# Patient Record
Sex: FEMALE | Race: WHITE | Employment: OTHER | RURAL
[De-identification: names, ages, dates, MRNs, and addresses within clinical notes are randomized per-mention and may not be internally consistent; named-entity substitution may affect disease eponyms.]

---

## 2019-11-12 PROBLEM — H25.12 AGE-RELATED NUCLEAR CATARACT, LEFT EYE: Chronic | Status: ACTIVE | Noted: 2019-11-12

## 2019-11-13 PROBLEM — H25.12 AGE-RELATED NUCLEAR CATARACT, LEFT EYE: Chronic | Status: RESOLVED | Noted: 2019-11-12 | Resolved: 2019-11-13

## 2019-11-19 PROBLEM — H25.11 AGE-RELATED NUCLEAR CATARACT, RIGHT EYE: Chronic | Status: ACTIVE | Noted: 2019-11-19

## 2019-11-20 PROBLEM — H25.11 AGE-RELATED NUCLEAR CATARACT, RIGHT EYE: Chronic | Status: RESOLVED | Noted: 2019-11-19 | Resolved: 2019-11-20

## 2019-11-26 PROBLEM — T85.29XD: Chronic | Status: ACTIVE | Noted: 2019-11-26

## 2019-11-27 PROBLEM — T85.29XD: Chronic | Status: RESOLVED | Noted: 2019-11-26 | Resolved: 2019-11-27

## 2021-04-20 ENCOUNTER — HOSPITAL ENCOUNTER (OUTPATIENT)
Dept: MAMMOGRAPHY | Age: 71
Discharge: HOME | End: 2021-04-20
Attending: FAMILY MEDICINE
Payer: MEDICARE

## 2021-04-20 DIAGNOSIS — Z12.31 VISIT FOR SCREENING MAMMOGRAM: ICD-10-CM

## 2021-04-20 PROCEDURE — 77063 BREAST TOMOSYNTHESIS BI: CPT

## 2022-04-22 ENCOUNTER — HOSPITAL ENCOUNTER (OUTPATIENT)
Dept: MAMMOGRAPHY | Age: 72
Discharge: HOME | End: 2022-04-22
Attending: FAMILY MEDICINE
Payer: MEDICARE

## 2022-04-22 VITALS — BODY MASS INDEX: 23.05 KG/M2 | WEIGHT: 122 LBS

## 2022-04-22 DIAGNOSIS — Z12.31 VISIT FOR SCREENING MAMMOGRAM: ICD-10-CM

## 2022-04-22 PROCEDURE — 77063 BREAST TOMOSYNTHESIS BI: CPT

## 2023-02-20 ENCOUNTER — ANESTHESIA EVENT (OUTPATIENT)
Dept: SURGERY | Age: 73
End: 2023-02-20
Payer: MEDICARE

## 2023-02-22 NOTE — PERIOP NOTES
[address] [place] [address]  SURGICAL PRE-ADMISSION INSTRUCTIONS    ARRIVAL  You will be called the day before your surgery with your expected arrival time. Sign in at the  of the hospital.  You will be directed to the Surgical Waiting Room. Please arrive at your scheduled appointment time. You have been scheduled to arrive for your procedure one or two hours prior to the expected start time of your procedure. Every effort will be made to minimize your wait but please be aware that unforeseen circumstances may affect our schedule. EATING  DO NOT EAT OR DRINK ANYTHING AFTER MIDNIGHT ON THE EVENING BEFORE YOUR SURGERY OR ON THE DAY OF YOUR SURGERY except for your medications (as instructed) with a sip of water. Do not use gum, mints or lozenges on the morning of your surgery. Please do not smoke or chew tobacco before your surgery. MEDICATIONS   Take the following medications on the morning of your surgery with the smallest amount of water possible : Omeprazole    STOP THESE MEDICATIONS AT THE TIMES LISTED BELOW  Aspirin ;  7 days before                                                   DRIVING/TRANSPORATION  Have a responsible adult to drive you home from the hospital and to stay with you over night. Please have them plan to remain in the hospital during your surgery. Your surgery will not be done if you do not have a responsible adult to take you home and to stay with you. If you have arranged for public transport, you must have a responsible adult to ride with you (who is not the ). You may not drive for 24 hours after anesthesia. PREPARATION  If you have a Living WiIl/Advance Directive, please bring a copy with you to scan into your chart. Please DO NOT wear makeup or nail polish  Please leave valuables at home,  DO NOT wear jewelry. Wear loose, comfortable clothing that is large enough to cover a bulky dressing.     SPECIAL INSTRUCTIONS:  Shower with the Preoperative Skin Preparation CHLORHEXIDINE as instructed.     Reviewed above preoperative instructions and answered questions by phone interview    Patient:  Lara Sharp   Date:     February 22, 2023  Time:   11:07 AM    RN:  Brendon Graham RN    Date:     February 22, 2023  Time:   11:07 AM

## 2023-02-26 PROBLEM — M65.312 TRIGGER THUMB OF LEFT HAND: Status: ACTIVE | Noted: 2023-02-26

## 2023-02-26 NOTE — H&P
History and Physical    Patient: Sarina Cat MRN: [id number]  SSN: [id number]    YOB: 1950  Age: 71 y.o. Sex: female      Subjective:      Sarina Cat is a 71 y.o. female who has sx left trigger thumb. Here for A1 pulley release left thumb. Past Medical History:   Diagnosis Date    Diabetes ([address]     Menopause      Past Surgical History:   Procedure Laterality Date    HX ORTHOPAEDIC      R Trigger release    [place] BREAST REDUCTION        Family History   Problem Relation Age of Onset    Breast Cancer Mother         dx age 76     Social History     Tobacco Use    Smoking status: Not on file    Smokeless tobacco: Not on file   Substance Use Topics    Alcohol use: Not on file      Prior to Admission medications    Medication Sig Start Date End Date Taking? Authorizing Provider   aspirin delayed-release 81 mg tablet Aspir-81   Yes Provider, Historical   liraglutide (VICTOZA) 0.6 mg/0.1 mL (18 mg/3 mL) pnij Victoza 2-Darin 0.6 mg/0.1 mL (18 mg/3 mL) subcutaneous pen injector   Inject by subcutaneous route. Yes Provider, Historical   cholecalciferol (VITAMIN D3) (1000 Units /25 mcg) tablet Take  by mouth. Yes Provider, Historical   lisinopril (PRINIVIL, ZESTRIL) 2.5 mg tablet lisinopril 2.5 mg tablet   TAKE 1 TABLET BY MOUTH EVERY DAY FOR KIDNEY PROTECTION FOR DIABETES 8/5/19  Yes Provider, Historical   metFORMIN ER (GLUCOPHAGE XR) 500 mg tablet 2 pills before breakfast and 2 pills before dinner 8/5/19  Yes Provider, Historical   omeprazole (PRILOSEC) 20 mg capsule Take 20 mg by mouth. Yes Provider, Historical   vit C,X-Wy-hpdqa-lutein-zeaxan (PRESERVISION AREDS-2) 250-90-40-1 mg cap capsule Take  by mouth. Yes Provider, Historical        Allergies   Allergen Reactions    Exenatide Nausea Only and Palpitations       Review of Systems:  A comprehensive review of systems was negative. Objective: There were no vitals filed for this visit.      Physical Exam:  GENERAL: alert, cooperative, no distress, appears stated age  LUNG: clear to auscultation bilaterally  HEART: regular rate and rhythm, S1, S2 normal, no murmur, click, rub or gallop  ABDOMEN: soft, non-tender. Bowel sounds normal. No masses,  no organomegaly  EXTREMITIES:  extremities normal, atraumatic, no cyanosis or edema. Painful catching at A1 left thumb  SKIN: Normal.  NEUROLOGIC: negative    Assessment:     Hospital Problems  Date Reviewed: 11/27/2019            Codes Class Noted POA    * (Principal) Trigger thumb of left hand ICD-10-CM: M65.312  ICD-9-CM: 727.03  2/26/2023 Yes           Plan:     Left trigger thumb release.     Signed By: Mayur Ridley MD     February 26, 2023

## 2023-02-28 ENCOUNTER — HOSPITAL ENCOUNTER (OUTPATIENT)
Age: 73
Setting detail: OUTPATIENT SURGERY
Discharge: HOME | End: 2023-02-28
Attending: ORTHOPAEDIC SURGERY | Admitting: ORTHOPAEDIC SURGERY
Payer: MEDICARE

## 2023-02-28 ENCOUNTER — ANESTHESIA (OUTPATIENT)
Dept: SURGERY | Age: 73
End: 2023-02-28
Payer: MEDICARE

## 2023-02-28 VITALS
HEART RATE: 72 BPM | OXYGEN SATURATION: 96 % | RESPIRATION RATE: 19 BRPM | TEMPERATURE: 97.6 F | SYSTOLIC BLOOD PRESSURE: 112 MMHG | DIASTOLIC BLOOD PRESSURE: 65 MMHG

## 2023-02-28 DIAGNOSIS — M65.312 TRIGGER THUMB OF LEFT HAND: Primary | ICD-10-CM

## 2023-02-28 LAB
GLUCOSE BLD STRIP.AUTO-MCNC: 114 MG/DL (ref 65–117)
SERVICE CMNT-IMP: NORMAL

## 2023-02-28 PROCEDURE — 74011250636 HC RX REV CODE- 250/636: Performed by: ANESTHESIOLOGY

## 2023-02-28 PROCEDURE — 74011000250 HC RX REV CODE- 250: Performed by: ORTHOPAEDIC SURGERY

## 2023-02-28 PROCEDURE — 76010000138 HC OR TIME 0.5 TO 1 HR: Performed by: ORTHOPAEDIC SURGERY

## 2023-02-28 PROCEDURE — 74011250636 HC RX REV CODE- 250/636: Performed by: ORTHOPAEDIC SURGERY

## 2023-02-28 PROCEDURE — 74011000250 HC RX REV CODE- 250: Performed by: ANESTHESIOLOGY

## 2023-02-28 PROCEDURE — 76210000063 HC OR PH I REC FIRST 0.5 HR: Performed by: ORTHOPAEDIC SURGERY

## 2023-02-28 PROCEDURE — 77030002916 HC SUT ETHLN J&J -A: Performed by: ORTHOPAEDIC SURGERY

## 2023-02-28 PROCEDURE — 76060000032 HC ANESTHESIA 0.5 TO 1 HR: Performed by: ORTHOPAEDIC SURGERY

## 2023-02-28 PROCEDURE — 82962 GLUCOSE BLOOD TEST: CPT

## 2023-02-28 PROCEDURE — 2709999900 HC NON-CHARGEABLE SUPPLY: Performed by: ORTHOPAEDIC SURGERY

## 2023-02-28 PROCEDURE — 77030000032 HC CUF TRNQT ZIMM -B: Performed by: ORTHOPAEDIC SURGERY

## 2023-02-28 RX ORDER — PROPOFOL 10 MG/ML
INJECTION, EMULSION INTRAVENOUS AS NEEDED
Status: DISCONTINUED | OUTPATIENT
Start: 2023-02-28 | End: 2023-02-28 | Stop reason: HOSPADM

## 2023-02-28 RX ORDER — LIDOCAINE HYDROCHLORIDE 20 MG/ML
INJECTION, SOLUTION EPIDURAL; INFILTRATION; INTRACAUDAL; PERINEURAL AS NEEDED
Status: DISCONTINUED | OUTPATIENT
Start: 2023-02-28 | End: 2023-02-28 | Stop reason: HOSPADM

## 2023-02-28 RX ORDER — SODIUM CHLORIDE 0.9 % (FLUSH) 0.9 %
5-40 SYRINGE (ML) INJECTION EVERY 8 HOURS
Status: DISCONTINUED | OUTPATIENT
Start: 2023-02-28 | End: 2023-02-28 | Stop reason: HOSPADM

## 2023-02-28 RX ORDER — HYDROCODONE BITARTRATE AND ACETAMINOPHEN 7.5; 325 MG/1; MG/1
1 TABLET ORAL
Qty: 6 TABLET | Refills: 0 | Status: SHIPPED | OUTPATIENT
Start: 2023-02-28 | End: 2023-03-02

## 2023-02-28 RX ORDER — MIDAZOLAM HYDROCHLORIDE 1 MG/ML
INJECTION, SOLUTION INTRAMUSCULAR; INTRAVENOUS AS NEEDED
Status: DISCONTINUED | OUTPATIENT
Start: 2023-02-28 | End: 2023-02-28 | Stop reason: HOSPADM

## 2023-02-28 RX ORDER — BUPIVACAINE HYDROCHLORIDE 5 MG/ML
10 INJECTION, SOLUTION EPIDURAL; INTRACAUDAL ONCE
Status: COMPLETED | OUTPATIENT
Start: 2023-02-28 | End: 2023-02-28

## 2023-02-28 RX ORDER — SODIUM CHLORIDE, SODIUM LACTATE, POTASSIUM CHLORIDE, CALCIUM CHLORIDE 600; 310; 30; 20 MG/100ML; MG/100ML; MG/100ML; MG/100ML
100 INJECTION, SOLUTION INTRAVENOUS CONTINUOUS
Status: DISCONTINUED | OUTPATIENT
Start: 2023-02-28 | End: 2023-02-28 | Stop reason: HOSPADM

## 2023-02-28 RX ORDER — SODIUM CHLORIDE 0.9 % (FLUSH) 0.9 %
5-40 SYRINGE (ML) INJECTION AS NEEDED
Status: DISCONTINUED | OUTPATIENT
Start: 2023-02-28 | End: 2023-02-28 | Stop reason: HOSPADM

## 2023-02-28 RX ORDER — HYDROCODONE BITARTRATE AND ACETAMINOPHEN 5; 325 MG/1; MG/1
1 TABLET ORAL
Qty: 8 TABLET | Refills: 0 | Status: SHIPPED | OUTPATIENT
Start: 2023-02-28 | End: 2023-03-03

## 2023-02-28 RX ADMIN — SODIUM CHLORIDE, POTASSIUM CHLORIDE, SODIUM LACTATE AND CALCIUM CHLORIDE 100 ML/HR: 600; 310; 30; 20 INJECTION, SOLUTION INTRAVENOUS at 07:40

## 2023-02-28 RX ADMIN — PROPOFOL 20 MG: 10 INJECTION, EMULSION INTRAVENOUS at 07:55

## 2023-02-28 RX ADMIN — PROPOFOL 20 MG: 10 INJECTION, EMULSION INTRAVENOUS at 07:50

## 2023-02-28 RX ADMIN — MIDAZOLAM 2 MG: 1 INJECTION INTRAMUSCULAR; INTRAVENOUS at 07:42

## 2023-02-28 RX ADMIN — PROPOFOL 20 MG: 10 INJECTION, EMULSION INTRAVENOUS at 07:52

## 2023-02-28 RX ADMIN — PROPOFOL 20 MG: 10 INJECTION, EMULSION INTRAVENOUS at 07:48

## 2023-02-28 RX ADMIN — BUPIVACAINE HYDROCHLORIDE 50 MG: 5 INJECTION, SOLUTION EPIDURAL; INTRACAUDAL; PERINEURAL at 08:00

## 2023-02-28 RX ADMIN — PROPOFOL 20 MG: 10 INJECTION, EMULSION INTRAVENOUS at 08:03

## 2023-02-28 RX ADMIN — PROPOFOL 20 MG: 10 INJECTION, EMULSION INTRAVENOUS at 07:59

## 2023-02-28 RX ADMIN — LIDOCAINE HYDROCHLORIDE 60 MG: 20 INJECTION, SOLUTION EPIDURAL; INFILTRATION; INTRACAUDAL; PERINEURAL at 07:48

## 2023-02-28 NOTE — DISCHARGE INSTRUCTIONS
Isabela Hansen MD      POST-OPERATIVE INSTRUCTIONS  Carpal Tunnel Release    Patient Name  Jona Guess  Date of procedure  2/28/2023    Procedure  Procedure(s):  TRIGGER THUMB RELEASE (W/LOCAL)  Surgeon  Surgeon(s) and Role:     * Aaliyah Weinberg MD - Primary  Date of discharge: [unfilled]  PCP: @PCP@      First meal at home should be clear liquids. Progress to regular diet as tolerated. Apply an ice bag or use cold therapy device for 20-30 minutes 4 times a day for the first 48-72 hours to reduce swelling and pain and then use as desired. A prescription for pain medication will be provided to you on the day of surgery. Do not take any aspirin for one week after surgery. Please inform us of any allergies. You may remove the bulky dressing 48 hours after surgery and leave the clear dressing in place. You may shower with clear dressing in place. You may remove the clear dressing 5 days after your surgery, if necessary, and cover with a bandaid. Avoid any lifting, gripping, or use with that hand until follow-up appointment. A post-op appointment has been scheduled for you. Please call the office if you need to re-schedule your appointment for another day or time ([phone]). If you develop a fever greater than 101, unexpected redness or swelling, please call the office immediately. Some bleeding and or drainage can be expected the first few days after surgery. Thank you for following the above instructions. If you have any questions, please call my office and the  will put you in touch with one of my assistants ([phone]).     ______________________________________________________________________    Anesthesia Discharge Instructions    After general anesthesia or intervenous sedation, for 24 hours or while taking prescription Narcotics:  Limit your activities  Do not drive or operate hazardous machinery  If you have not urinated within 8 hours after discharge, please contact your surgeon on call. Do not make important personal or business decisions  Do not drink alcoholic beverages    Report the following to your surgeon:  Excessive pain, swelling, redness or odor of or around the surgical area  Temperature over 100.5 degrees  Nausea and vomiting lasting longer than 4 hours or if unable to take medication  Any signs of decreased circulation or nerve impairment to extremity:  Change in color, persistent numbness, tingling, coldness or increased pain.   Any questions

## 2023-02-28 NOTE — ANESTHESIA POSTPROCEDURE EVALUATION
Procedure(s):  TRIGGER THUMB RELEASE (W/LOCAL).     MAC    Anesthesia Post Evaluation      Multimodal analgesia: multimodal analgesia used between 6 hours prior to anesthesia start to PACU discharge  Patient participation: complete - patient participated  Level of consciousness: awake and alert  Anesthetic complications: no  Cardiovascular status: acceptable  Respiratory status: acceptable  Hydration status: acceptable  Post anesthesia nausea and vomiting:  none  Final Post Anesthesia Temperature Assessment:  Normothermia (36.0-37.5 degrees C)      INITIAL Post-op Vital signs:   Vitals Value Taken Time   /65 02/28/23 0820   Temp 36.4 °C (97.6 °F) 02/28/23 0820   Pulse 67 02/28/23 0820   Resp 17 02/28/23 0820   SpO2 96 % 02/28/23 0820

## 2023-02-28 NOTE — OP NOTES
[address] [place] [address]   [address] [facility], [address], [address] [facility]   OPERATIVE REPORT            Name: Jeny Lundy  MRN:  [id number]  : 1950  Age:  71 y.o. Surgery Date: 2023      PREOPERATIVE DIAGNOSIS: Symptomatic left thumb trigger finger. POSTOPERATIVE DIAGNOSIS: Symptomatic left thumb trigger finger. PROCEDURES PERFORMED: left thumb A1 pulley release. SURGEON: Nohemi Alonso MD.      ANESTHESIA: Local MAC. ESTIMATED BLOOD LOSS: 0 tourniquet control. COMPLICATIONS: None. 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 SPECIMENS REMOVED: none     DESCRIPTION OF PROCEDURE: The patient was evaluated in the   outpatient the patient surgery area. Surgical site was marked and verified with   the patient. The patient was taken to the operating room, placed supine on the   operating table. The arm placed on the arm board. After adequate sedation, the   area was anesthetized with 5 mL of 0.5% Marcaine. The extremity prepped   and draped in usual sterile fashion. Time-out was called, verifying the patient   site and surgical procedure. The extremity was exsanguinated and tourniquet   inflated to 250 mmHg. A transverse incision was made over the involved   finger. Dissection carried sharply through skin, bluntly through subcutaneous   tissue. The retractors were inserted. The neurovascular bundles were retracted   to either side. This gave exposure to the tendon sheath in the wound. After   appropriate retractors were inserted, the proximal portion of the tendon sheath   was incised and split. Additional split crried distally with scissors under direct visualization, completing the   split of the A1 pulley. At this point, the operative site was irrigated with   antibiotic saline solution. The finger was taken through a range of motion to   verify that there was no further triggering through a full range of motion. The   incision closed with 4-0 nylon interrupted sutures. A sterile dressing was   applied. Tourniquet was released for a tourniquet time of 10 minutes. The   patient returned to the recovery room in satisfactory condition.            Manjula Hogue MD

## 2023-02-28 NOTE — BRIEF OP NOTE
Brief Postoperative Note    Patient: Kirby Macdonald  YOB: 1950  MRN: [id number]    Date of Procedure: 2/28/2023     Pre-Op Diagnosis: Left Trigger Thumb    Post-Op Diagnosis: Same as preoperative diagnosis. Procedure(s):  TRIGGER THUMB RELEASE (W/LOCAL)    Surgeon(s):  Cari Chandra MD    Surgical Assistant: None    Anesthesia: MAC     Estimated Blood Loss (mL): Minimal    Complications: None    Specimens: * No specimens in log *     Implants: * No implants in log *    Drains: * No LDAs found *    Findings: stenosis of A1 pulley left thumb.     Electronically Signed by Leonardo Conn MD on 2/28/2023 at 8:18 AM

## 2023-02-28 NOTE — ANESTHESIA PREPROCEDURE EVALUATION
Relevant Problems   No relevant active problems       Anesthetic History   No history of anesthetic complications            Review of Systems / Medical History  Patient summary reviewed, nursing notes reviewed and pertinent labs reviewed    Pulmonary  Within defined limits                 Neuro/Psych   Within defined limits           Cardiovascular  Within defined limits                Exercise tolerance: >4 METS     GI/Hepatic/Renal  Within defined limits              Endo/Other    Diabetes         Other Findings              Physical Exam    Airway  Mallampati: III  TM Distance: 4 - 6 cm  Neck ROM: decreased range of motion   Mouth opening: Diminished (comment)     Cardiovascular  Regular rate and rhythm,  S1 and S2 normal,  no murmur, click, rub, or gallop  Rhythm: regular  Rate: normal         Dental  No notable dental hx       Pulmonary  Breath sounds clear to auscultation               Abdominal  GI exam deferred       Other Findings            Anesthetic Plan    ASA: 2  Anesthesia type: MAC            Anesthetic plan and risks discussed with: Patient

## 2023-04-24 DIAGNOSIS — Z12.31 VISIT FOR SCREENING MAMMOGRAM: Primary | ICD-10-CM

## 2023-05-24 ENCOUNTER — HOSPITAL ENCOUNTER (OUTPATIENT)
Facility: HOSPITAL | Age: 73
Discharge: HOME | End: 2023-05-27
Attending: FAMILY MEDICINE
Payer: MEDICARE

## 2023-05-24 VITALS — BODY MASS INDEX: 23.03 KG/M2 | WEIGHT: 122 LBS | HEIGHT: 61 IN

## 2023-05-24 DIAGNOSIS — Z12.31 VISIT FOR SCREENING MAMMOGRAM: ICD-10-CM

## 2023-05-24 PROCEDURE — 77063 BREAST TOMOSYNTHESIS BI: CPT

## 2023-07-11 ENCOUNTER — HOSPITAL ENCOUNTER (OUTPATIENT)
Facility: HOSPITAL | Age: 73
Discharge: HOME | End: 2023-07-14
Payer: MEDICARE

## 2023-07-11 DIAGNOSIS — M54.50 LOW BACK PAIN, UNSPECIFIED BACK PAIN LATERALITY, UNSPECIFIED CHRONICITY, UNSPECIFIED WHETHER SCIATICA PRESENT: ICD-10-CM

## 2023-07-11 PROCEDURE — 72110 X-RAY EXAM L-2 SPINE 4/>VWS: CPT

## 2023-08-10 ENCOUNTER — HOSPITAL ENCOUNTER (OUTPATIENT)
Facility: HOSPITAL | Age: 73
Discharge: HOME | End: 2023-08-10
Attending: ORTHOPAEDIC SURGERY
Payer: MEDICARE

## 2023-08-10 DIAGNOSIS — G57.02 LESION OF LEFT SCIATIC NERVE: ICD-10-CM

## 2023-08-10 PROCEDURE — 72148 MRI LUMBAR SPINE W/O DYE: CPT

## 2024-05-07 ENCOUNTER — TRANSCRIBE ORDERS (OUTPATIENT)
Facility: HOSPITAL | Age: 74
End: 2024-05-07

## 2024-05-07 DIAGNOSIS — Z12.31 SCREENING MAMMOGRAM FOR BREAST CANCER: Primary | ICD-10-CM

## 2024-05-28 ENCOUNTER — HOSPITAL ENCOUNTER (OUTPATIENT)
Facility: HOSPITAL | Age: 74
Discharge: HOME | End: 2024-05-31
Attending: FAMILY MEDICINE
Payer: MEDICARE

## 2024-05-28 VITALS — WEIGHT: 123 LBS | BODY MASS INDEX: 23.24 KG/M2

## 2024-05-28 DIAGNOSIS — Z12.31 SCREENING MAMMOGRAM FOR BREAST CANCER: ICD-10-CM

## 2024-05-28 PROCEDURE — 77063 BREAST TOMOSYNTHESIS BI: CPT

## 2025-05-12 ENCOUNTER — TRANSCRIBE ORDERS (OUTPATIENT)
Facility: HOSPITAL | Age: 75
End: 2025-05-12

## 2025-05-12 DIAGNOSIS — Z12.31 SCREENING MAMMOGRAM FOR BREAST CANCER: Primary | ICD-10-CM

## 2025-06-12 ENCOUNTER — HOSPITAL ENCOUNTER (OUTPATIENT)
Facility: HOSPITAL | Age: 75
Discharge: HOME | End: 2025-06-15
Attending: FAMILY MEDICINE
Payer: MEDICARE

## 2025-06-12 DIAGNOSIS — Z12.31 SCREENING MAMMOGRAM FOR BREAST CANCER: ICD-10-CM

## 2025-06-12 PROCEDURE — 77063 BREAST TOMOSYNTHESIS BI: CPT

## (undated) DEVICE — APPLICATOR SCRB 26ML TEAL STRL -- CHLORAPREP 26ML

## (undated) DEVICE — BANDAGE,ELASTIC,ESMARK,STERILE,4"X9',LF: Brand: MEDLINE

## (undated) DEVICE — DRAPE,EXTREMITY,89X128,STERILE: Brand: MEDLINE

## (undated) DEVICE — GOWN,REINFORCED,POLY,AURORA,XLARGE,STRL: Brand: MEDLINE

## (undated) DEVICE — SYRINGE BLB FEED IV POLE BG 60ML

## (undated) DEVICE — PADDING CAST W4INXL4YD HIGHLY ABSRB THAN COT EZ APPL

## (undated) DEVICE — 1230 DOUBLE MAGNET NEEDLE COUNTER,BLACK: Brand: DEVON

## (undated) DEVICE — INTENDED USE FOR SURGICAL MARKING ON INTACT SKIN, ALSO PROVIDES A PERMANENT METHOD OF IDENTIFYING OBJECTS IN THE OPERATING ROOM: Brand: WRITESITE® REGULAR TIP SKIN MARKER

## (undated) DEVICE — Device

## (undated) DEVICE — SUTURE ETHLN SZ 3-0 L18IN NONABSORBABLE BLK FS-1 L24MM 3/8 663H

## (undated) DEVICE — STERILE POLYISOPRENE POWDER-FREE SURGICAL GLOVES: Brand: PROTEXIS

## (undated) DEVICE — X-RAY DETECTABLE SPONGES,16 PLY: Brand: VISTEC

## (undated) DEVICE — SOLUTION IRRIG 1000ML 0.9% SOD CHL USP POUR PLAS BTL

## (undated) DEVICE — INTENDED FOR TISSUE SEPARATION, AND OTHER PROCEDURES THAT REQUIRE A SHARP SURGICAL BLADE TO PUNCTURE OR CUT.: Brand: BARD-PARKER SAFETY BLADES SIZE 15, STERILE

## (undated) DEVICE — SKIN MARKER,REGULAR TIP WITH RULER: Brand: DEVON

## (undated) DEVICE — ZIMMER® STERILE DISPOSABLE TOURNIQUET CUFF WITH PROTECTIVE SLEEVE AND PLC, DUAL PORT, SINGLE BLADDER, 18 IN. (46 CM)

## (undated) DEVICE — NDL HYPO REG BVL RW 25GX1.5IN --

## (undated) DEVICE — GAUZE,SPONGE,4"X4",16PLY,STRL,LF,10/TRAY: Brand: MEDLINE

## (undated) DEVICE — COVER,TABLE,44X76,STERILE: Brand: MEDLINE

## (undated) DEVICE — INFECTION CONTROL KIT SYS

## (undated) DEVICE — DRAPE SHT 3 QTR PROXIMA 53X77 --